# Patient Record
Sex: FEMALE | Race: WHITE | NOT HISPANIC OR LATINO | URBAN - METROPOLITAN AREA
[De-identification: names, ages, dates, MRNs, and addresses within clinical notes are randomized per-mention and may not be internally consistent; named-entity substitution may affect disease eponyms.]

---
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
## 2024-01-08 NOTE — ED PROVIDER NOTE - NS ED MD DISPO DISCHARGE CCDA
[facility]     Hospitalist Progress Note     Sarika Rob Patient Status:  Inpatient    10/3/1942 MRN [id number]   Location [facility] 5NW-A Attending Parisa Vargas,    Hosp Day # 0 PCP Baltazar Baker MD     Chief Complaint: Cough    Subjective:     Patient feels weak with some pain in his ribs/chest but only when he coughs  Denies difficulty breathing     Objective:    Review of Systems:   A comprehensive review of systems was completed; pertinent positive and negatives stated in subjective.    Vital signs:  Temp:  [97.8 °F (36.6 °C)-99.2 °F (37.3 °C)] 97.8 °F (36.6 °C)  Pulse:  [62-75] 62  Resp:  [16-19] 18  BP: (115-137)/(55-80) 123/59  SpO2:  [94 %-98 %] 96 %    Physical Exam:    General: No acute distress  Respiratory: no wheezes, no rhonchi  Cardiovascular: S1, S2, regular rate and rhythm  Abdomen: Soft, Non-tender, non-distended, positive bowel sounds  Neuro: No new focal deficits.   Extremities: no edema      Diagnostic Data:    Labs:  Recent Labs   Lab 24  2208   WBC 5.9   HGB 9.9*   .8*   PLT 99.0*       Recent Labs   Lab 24  2208   *   BUN 61*   CREATSERUM 7.42*   CA 9.0   ALB 3.6      K 4.4      CO2 35.0*   ALKPHO 73   AST 12*   ALT 20   BILT 0.4   TP 7.3       Estimated Creatinine Clearance: 7.7 mL/min (A) (based on SCr of 7.42 mg/dL (H)).    No results for input(s): \"TROP\", \"TROPHS\", \"CK\" in the last 168 hours.    No results for input(s): \"PTP\", \"INR\" in the last 168 hours.               Microbiology    No results found for this visit on 24.      Imaging: Reviewed in Epic.    Medications:    amLODIPine  10 mg Oral Daily    aspirin  81 mg Oral Daily    calcium acetate  2 capsule Oral TID    cinacalcet  30 mg Oral Daily with breakfast    docusate sodium  100 mg Oral BID    escitalopram  20 mg Oral QAM    folic acid  1 mg Oral Daily    latanoprost  1 drop Ophthalmic Daily    losartan  100 mg Oral Daily    melatonin  5 mg Oral Nightly    pregabalin   25 mg Oral TID    rOPINIRole  1 mg Oral Nightly    rosuvastatin  10 mg Oral Nightly    insulin aspart  1-68 Units Subcutaneous TID CC    heparin  5,000 Units Subcutaneous Q8H JESSI    insulin aspart  1-10 Units Subcutaneous TID AC and HS    insulin detemir  3 Units Subcutaneous BID    azithromycin  500 mg Intravenous Q24H    cefTRIAXone  1 g Intravenous Q24H    guaiFENesin ER  600 mg Oral BID    remdesivir  100 mg Intravenous Q24H       Assessment & Plan:      #COVID-19 with superimposed bacterial PNA  -not a candidate for paxlovid w/ renal failure  -ID consulted in ED, recommended admission for RDV due to high risk  -no significant hypoxia noted  -antibx CAP coverage  -Remdesevir per ID     #ESRD on HD  -HD per nephrology on consult      #MM   -on Revlimid     #Essential HTN  -Amlodipine, Losartan    #Chronic Anemia/Thrombocytopenia, history of Multiple Myeloma   -monitor counts    #Anxiety/Depression  -SSRI      Parisa Vargas,     Supplementary Documentation:     Quality:  DVT Mechanical Prophylaxis:   SCDs,    DVT Pharmacologic Prophylaxis   Medication    heparin (Porcine) 5000 UNIT/ML injection 5,000 Units         DVT Pharmacologic prophylaxis: Aspirin 81 mg      Code Status: Not on file  Lepe: No urinary catheter in place    The 21st Century Cures Act makes medical notes like these available to patients in the interest of transparency. Please be advised this is a medical document. Medical documents are intended to carry relevant information, facts as evident, and the clinical opinion of the practitioner. The medical note is intended as peer to peer communication and may appear blunt or direct. It is written in medical language and may contain abbreviations or verbiage that are unfamiliar.                    Patient/Caregiver provided printed discharge information.
